# Patient Record
Sex: MALE | Race: OTHER | NOT HISPANIC OR LATINO | URBAN - METROPOLITAN AREA
[De-identification: names, ages, dates, MRNs, and addresses within clinical notes are randomized per-mention and may not be internally consistent; named-entity substitution may affect disease eponyms.]

---

## 2024-10-16 ENCOUNTER — EMERGENCY (EMERGENCY)
Facility: HOSPITAL | Age: 59
LOS: 0 days | Discharge: HOME | End: 2024-10-17
Attending: EMERGENCY MEDICINE
Payer: SELF-PAY

## 2024-10-16 VITALS
RESPIRATION RATE: 16 BRPM | DIASTOLIC BLOOD PRESSURE: 78 MMHG | OXYGEN SATURATION: 99 % | HEART RATE: 86 BPM | SYSTOLIC BLOOD PRESSURE: 131 MMHG | TEMPERATURE: 98 F

## 2024-10-16 DIAGNOSIS — M25.572 PAIN IN LEFT ANKLE AND JOINTS OF LEFT FOOT: ICD-10-CM
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
## 2024-10-16 DIAGNOSIS — S82.852A DISPLACED TRIMALLEOLAR FRACTURE OF LEFT LOWER LEG, INITIAL ENCOUNTER FOR CLOSED FRACTURE: ICD-10-CM
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
## 2024-10-16 PROCEDURE — 73630 X-RAY EXAM OF FOOT: CPT | Mod: 26,LT

## 2024-10-16 PROCEDURE — 99284 EMERGENCY DEPT VISIT MOD MDM: CPT | Mod: 25

## 2024-10-16 PROCEDURE — 99284 EMERGENCY DEPT VISIT MOD MDM: CPT | Mod: 57

## 2024-10-16 PROCEDURE — 27816 TREATMENT OF ANKLE FRACTURE: CPT | Mod: 54,LT

## 2024-10-16 PROCEDURE — 99053 MED SERV 10PM-8AM 24 HR FAC: CPT

## 2024-10-16 PROCEDURE — 29515 APPLICATION SHORT LEG SPLINT: CPT | Mod: LT

## 2024-10-16 PROCEDURE — 73610 X-RAY EXAM OF ANKLE: CPT | Mod: 26,LT

## 2024-10-16 PROCEDURE — 73630 X-RAY EXAM OF FOOT: CPT | Mod: LT

## 2024-10-16 PROCEDURE — 73610 X-RAY EXAM OF ANKLE: CPT | Mod: LT

## 2024-10-16 RX ADMIN — Medication 600 MILLIGRAM(S): at 23:36

## 2024-10-16 NOTE — ED ADULT TRIAGE NOTE - CHIEF COMPLAINT QUOTE
Patient states he tripped and fell down 4 stairs. Denies loc not on blood thinners. Unable to bare weight on left ankle.

## 2024-10-17 VITALS
DIASTOLIC BLOOD PRESSURE: 87 MMHG | TEMPERATURE: 98 F | SYSTOLIC BLOOD PRESSURE: 160 MMHG | HEART RATE: 70 BPM | OXYGEN SATURATION: 98 % | RESPIRATION RATE: 16 BRPM

## 2024-10-17 NOTE — ED PROVIDER NOTE - NSFOLLOWUPINSTRUCTIONS_ED_ALL_ED_FT
Our Emergency Department Referral Coordinators will be reaching out to you in the next 24-48 hours from 9:00am to 5:00pm to schedule a follow up appointment. Please expect a phone call from the hospital in that time frame. If you do not receive a call or if you have any questions or concerns, you can reach them at   [phone]        Fracture    A fracture is a break in one of your bones. This can occur from a variety of injuries, especially traumatic ones. Symptoms include pain, bruising, or swelling. Do not use the injured limb. If a fracture is in one of the bones below your waist, do not put weight on that limb unless instructed to do so by your healthcare provider. Crutches or a cane may have been provided. A splint or cast may have been applied by your health care provider. Make sure to keep it dry and follow up with an orthopedist as instructed.    SEEK IMMEDIATE MEDICAL CARE IF YOU HAVE ANY OF THE FOLLOWING SYMPTOMS: numbness, tingling, increasing pain, or weakness in any part of the injured limb.

## 2024-10-17 NOTE — ED PROVIDER NOTE - OBJECTIVE STATEMENT
58 yo M no pmhx presenting to the ED for evaluation of L ankle injury. pt was walking up the stairs, missed a step and twisted his L ankle, since that time has had worsening pain and swelling. denies any other injuries, not on anticoagulation. denies numbness/tingling or weakness

## 2024-10-17 NOTE — ED PROVIDER NOTE - CARE PROVIDER_API CALL
Philip Arteaga  Orthopaedic Surgery  [address] [place] [address]  Phone: [phone]  Fax: [phone]  Follow Up Time: Urgent

## 2024-10-17 NOTE — ED PROVIDER NOTE - PHYSICAL EXAMINATION
GENERAL: Well-nourished, Well-developed. NAD.  HEAD: No visible or palpable bumps or hematomas. No ecchymosis behind ears B/L.  CVS: Normal S1,S2. No murmurs appreciated on auscultation   RESP: Lungs clear to auscultation B/L. No wheezing, rales, or rhonchi auscultated.  MSK: (+)L ankle swelling with ttp and LROM. L knee and proximal tib/fib without ttp, FROM.  Skin: Warm, Dry. No rashes or lesions. Good cap refill < 2 sec B/L.  EXT: pedal pulses present B/L.   Neuro: NVI

## 2024-10-17 NOTE — ED PROVIDER NOTE - CLINICAL SUMMARY MEDICAL DECISION MAKING FREE TEXT BOX
60 yo M healthy, vaccinated here for assessment sp inversion injury of L ankle. Has been unable to bear weight since and is developing worsening swelling.    On exam has moderate swelling, limited active and passive ROM, good pulses.     XR with trimal fracture, patient placed in "mayte" splint, advised on need to be non weight bearing, ortho follow up, signs of compartment syndrome, return precautions, follow up.

## 2024-10-17 NOTE — ED PROVIDER NOTE - PATIENT PORTAL LINK FT
You can access the FollowMyHealth Patient Portal offered by [facility] by registering at the following website: [email or web address]. By joining [facility]’s FollowMyHealth portal, you will also be able to view your health information using other applications (apps) compatible with our system.

## 2024-10-18 NOTE — CHART NOTE - NSCHARTNOTEFT_GEN_A_CORE
Busy line-  10/17. Phone number out of service, [place] 10/18. No others listed on profile (ortho referral).